# Patient Record
(demographics unavailable — no encounter records)

---

## 2024-10-18 NOTE — HISTORY OF PRESENT ILLNESS
[Localized] : localized [Massage] : massage [Physical therapy] : physical therapy [Full time] : Work status: full time [de-identified] : Patient s/p left shoulder arthroscopic labral repair, SAD, DCE, TASHIA on 4/2/24. She is in PT with progress, still sore a bit overall better [1] : 2 [0] : 0 [Radiating] : radiating [Tingling] : tingling [Sleep] : sleep [Lying in bed] : lying in bed [] : Post Surgical Visit: yes [FreeTextEntry1] : l shoulder [FreeTextEntry5] : level of pain for arm is constant 8 due to wearing post op sling. Saw Dr. 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

## 2024-10-18 NOTE — PHYSICAL EXAM
[Left] : left shoulder [5___] : external rotation 5[unfilled]/5 [] : motor and sensory intact distally [FreeTextEntry8] : mild [TWNoteComboBox7] : active forward flexion 175 degrees [TWNoteComboBox6] : internal rotation 55 degrees

## 2024-10-18 NOTE — REVIEW OF SYSTEMS
[Joint Pain] : joint pain [Joint Stiffness] : joint stiffness [Muscle Weakness] : muscle weakness [Negative] : Heme/Lymph [FreeTextEntry9] : in the AM [de-identified] : upper body

## 2024-11-01 NOTE — ASSESSMENT
[FreeTextEntry1] : 54 y/o F with prior hx of TIA presents for evaluation of pulsatile tinnitus in the right ear, intermittent for past one year, now noticing on a constant basis. Also having bilateral ear pain.   Will evaluate for vascular abnormalities, such as venous stenosis or aneurysm.     PLAN: - MRI Brain w/wo with IAC given bilateral ear pain - MRA and MRV head - Her prior hx of "TIA" is vague and could have been a migraine. She is not on treatment so will continue to monitor.

## 2024-11-01 NOTE — HISTORY OF PRESENT ILLNESS
[FreeTextEntry1] :  [facility] NEUROLOGY AT [place]  CC: Pulsatile tinnitus HPI: 52 y/o F with prior hx of "TIA" presents for evaluation of pulsatile tinnitus.   States "I can hear my heartbeat coming and going."  Started last year, was initially once in a while, now daily. Has checked BP during times of pulsatile tinnitus and its considered normal.   No associated headaches. No diplopia, or blurry vision. Seen by her eye doctor yesterday and was told everything looked OK.  Has regular tinnitus on the left side.  Has tried hearing aides which did not really help so she stopped it.  Now for past one month has complaints of bilateral ear pain. Has not seen ENT.  Has seasonal allergies.    Mother with Bicuspid valve replacement, hx of pulsatile tinnitus also that resolved after valve surgery.    States she has a prior hx of TIA.  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  Duration was 15 mins.  Unclear if headache.  Went to [facility] ED.  Unclear if MRI was completed but states she never had it again.  Has been following with Cardiology since then.  Not on aspirin.  Has a hx of migraines, preceded by visual aura dots), usually goes away with Tylenol.   Migraine with aura twice per year. Gets other severe HA without aura, but has photophobia when weather changes.

## 2024-11-01 NOTE — PHYSICAL EXAM
[FreeTextEntry1] :   General: Cooperative, NAD HEENT: NC/AT, no carotid bruits, temporal artery pulsations intact b/l, TM intact b/l Lungs: CTAB Chest: RRR, no murmurs Extremities: nontender, no erythema Neurological Examination: MS: AOx3. Appropriately interactive, normal affect. Speech fluent w/o paraphasic errors CN: No papilledema, PERLL, EOMI, V1-3 sensation intact, face symmetric, hearing intact, palate elevates symmetrically, tongue midline, SCM equal bilaterally Motor: normal bulk and tone, no tremor, rigidity or bradykinesia.  5/5 all over Sens: Intact to light touch. Reflexes: 2/4 all over, downgoing toes b/l Coord:  No dysmetria, JOEL intact Gait: Normal

## 2024-11-19 NOTE — CONSULT LETTER
[Dear  ___] : Dear  [unfilled], [Courtesy Letter:] : I had the pleasure of seeing your patient, [unfilled], in my office today. [Please see my note below.] : Please see my note below. [Sincerely,] : Sincerely, [FreeTextEntry2] : Dr. Ronald Jacobo [FreeTextEntry3] : Emil Hendrickson MD, [facility] Otol[facility]ology Sinus and Endoscopic Skull Base Surgery Head and Neck Surgery   [address] [facility], [address] [place] [id number] Tel: [phone] Fax:[phone]

## 2024-11-19 NOTE — PHYSICAL EXAM
[Normal] : mucosa is normal [Midline] : trachea located in midline position [de-identified] : no glomus

## 2024-11-19 NOTE — HISTORY OF PRESENT ILLNESS
[de-identified] : 53F here pulsatile tinnitus in the right ear. She reports that she has had left sided tinnitus for many years that began after an episode of dizziness. She has had right pulsatile tinnitus for over 1 year that has been intermittent, but over last 2 months has been constant.  She was prevously fit for hearing aids, but foudn them to be uncomfortable. She has hx of TIA, not on ASA.  She reports decreased hearing but has not had recent audio. She has had an MRI brain at [facility]. She does not have MRA or audio. She has hx of migraine.

## 2025-02-04 NOTE — CONSULT LETTER
[Dear  ___] : Dear  [unfilled], [Consult Letter:] : I had the pleasure of evaluating your patient, [unfilled]. [Please see my note below.] : Please see my note below. [Consult Closing:] : Thank you very much for allowing me to participate in the care of this patient.  If you have any questions, please do not hesitate to contact me. [Sincerely,] : Sincerely, [FreeTextEntry2] : Dr. Emil Hendrickson [FreeTextEntry3] : Margoth Tinsley MD, Otology Neurotology & Skull Base Surgery

## 2025-02-04 NOTE — PHYSICAL EXAM
[Binocular Microscopic Exam] : Binocular microscopic exam was performed [Rinne Test Air Conduction Persists > Bone Conduction Right] : air conduction greater than bone conduction on the right [Rinne Test Air Conduction Persists > Bone Conduction Left] : air conduction greater than bone conduction on the left [Hearing Baltazar Test (Tuning Fork On Forehead)] : no lateralization of tone [Hearing Loss Right Only] : normal [Hearing Loss Left Only] : normal [Nystagmus] : ~T no ~M nystagmus was seen [Fukuda Step Test] : Fukuda Step Test was Negative [Romberg's Sign] : Romberg's sign was absent [Fistula Sign] : Fistula Sign: Negative [Past-Pointing] : Past-Pointing: Negative [Geovanna-Hallsharonke] : Franklin-Hallpike: Negative [Normal] : no rashes

## 2025-02-04 NOTE — DATA REVIEWED
[de-identified] : TPM ordered for tinnitus prior audio reviewed TPM to 8k/60dB and audio with L 8kdrop in hearing to severe

## 2025-02-04 NOTE — HISTORY OF PRESENT ILLNESS
[de-identified] : tinnitus to left for > 5 yrs after acute event when she was also dizzy left ear with intermittent pulsatile tinnitus hearing is near normal

## 2025-06-05 NOTE — PLAN
[FreeTextEntry1] : Well woman exam  pap done mammo ordered colonoscopy recommended recommended taking vit. D 2000 units daily and through diet obtain 1200 mg of calcium along with 2.5 hours of weight bearing exercise per week return in 1 year  testosterone level ordered,lft, lipid,cbc if within 27-37.6mg/dl  will refill

## 2025-06-05 NOTE — HISTORY OF PRESENT ILLNESS
[FreeTextEntry1] : 54-year-old female  presenting to office for her annual well woman appointment.  Last year she was started on testosterone gel 1.62% and she states it has made a big difference. She would like a refill .  She had a Mirena IUD placed 2020 and did have some spotting for the first 6 months, which resolve, no pelvic pain. She is using the Mirena IUD secondary to history of dysmenorrhea.  Her  has had a vasectomy. She has never had a colonoscopy-Cologuard negative 4-5 yrs ago. Obstetrical history of 3 prior vaginal deliveries without complication.  Gynecologic history of dysmenorrhea, she reports having an abnormal Pap smear over 30 years ago.  Denies medical history.  Denies surgical history.  Patient had a mammogram which was significant for dense breast tissue with recommendation for follow-up in 1 years time, this upcoming 2023.  Denies alcohol, tobacco, illicit drug use.  Denies allergies to medications.  She is a HS